# Patient Record
(demographics unavailable — no encounter records)

---

## 2025-03-27 NOTE — HISTORY OF PRESENT ILLNESS
[FreeTextEntry6] : Originally scheduled for a hep A vaccine, child was sent home from day care with a low grade fever.

## 2025-04-09 NOTE — PHYSICAL EXAM
[NL] : warm, clear [FreeTextEntry5] : REDNES DISCHARGE [FreeTextEntry3] : FLUID PRESENT NO BULDGING NO HYPEREMIA [FreeTextEntry4] : STUFFY [FreeTextEntry7] : CLEAR

## 2025-05-13 NOTE — DISCUSSION/SUMMARY
[FreeTextEntry1] :  The components of the vaccine(s) to be administered today are listed in the plan of care. The disease(s) for which the vaccine(s) are intended to prevent and the risks have been discussed with the caretaker. The risks are also included in the appropriate vaccination information statements which have been provided to the patient's caregiver. The caregiver has given consent to vaccinate.

## 2025-05-13 NOTE — PHYSICAL EXAM
[Alert] : alert [Normocephalic] : normocephalic [Closed Anterior [place]] : closed anterior fontanelle [Red Reflex] : red reflex bilateral [PERRL] : PERRL [Normally Placed Ears] : normally placed ears [Auricles Well Formed] : auricles well formed [Clear Tympanic membranes] : clear tympanic membranes [Light reflex present] : light reflex present [Bony landmarks visible] : bony landmarks visible [Discharge] : no discharge [Nares Patent] : nares patent [Palate Intact] : palate intact [Uvula Midline] : uvula midline [Tooth Eruption] : tooth eruption [Supple, full passive range of motion] : supple, full passive range of motion [Palpable Masses] : no palpable masses [Symmetric Chest Rise] : symmetric chest rise [Clear to Auscultation Bilaterally] : clear to auscultation bilaterally [Regular Rate and Rhythm] : regular rate and rhythm [S1, S2 present] : S1, S2 present [Murmurs] : no murmurs [+2 Femoral Pulses] : (+) 2 femoral pulses [Soft] : soft [Tender] : nontender [Distended] : nondistended [Bowel Sounds] : normoactive bowel sounds [Hepatomegaly] : no hepatomegaly [Splenomegaly] : no splenomegaly [Central Urethral Opening] : central urethral opening [Testicles Descended] : testicles descended bilaterally [No Abnormal Lymph Nodes Palpated] : no abnormal lymph nodes palpated [Symmetric Abduction and Rotation of Hips] : symmetric abduction and rotation of hips [Allis Sign] : negative Allis sign [Straight] : straight [Cranial Nerves Grossly Intact] : cranial nerves grossly intact [Rash or Lesions] : no rash/lesions

## 2025-05-27 NOTE — REVIEW OF SYSTEMS
[Nasal Congestion] : nasal congestion [Rash] : rash [Negative] : Genitourinary [Itching] : no itching

## 2025-05-27 NOTE — PHYSICAL EXAM
[Erythematous Oropharynx] : erythematous oropharynx [NL] : moves all extremities x4, warm, well perfused x4 [Papules] : papules [de-identified] : coalescent red papules over the chest,hands